# Patient Record
Sex: MALE | Race: WHITE | NOT HISPANIC OR LATINO | Employment: FULL TIME | URBAN - NONMETROPOLITAN AREA
[De-identification: names, ages, dates, MRNs, and addresses within clinical notes are randomized per-mention and may not be internally consistent; named-entity substitution may affect disease eponyms.]

---

## 2024-01-04 ENCOUNTER — HOSPITAL ENCOUNTER (OUTPATIENT)
Dept: RADIOLOGY | Facility: HOSPITAL | Age: 59
Discharge: HOME | End: 2024-01-04
Payer: COMMERCIAL

## 2024-01-04 DIAGNOSIS — R10.9 UNSPECIFIED ABDOMINAL PAIN: ICD-10-CM

## 2024-01-04 PROCEDURE — 76700 US EXAM ABDOM COMPLETE: CPT | Performed by: STUDENT IN AN ORGANIZED HEALTH CARE EDUCATION/TRAINING PROGRAM

## 2024-01-04 PROCEDURE — 76700 US EXAM ABDOM COMPLETE: CPT

## 2024-01-26 ASSESSMENT — ENCOUNTER SYMPTOMS
EYES NEGATIVE: 1
SHORTNESS OF BREATH: 0
COUGH: 0
ENDOCRINE NEGATIVE: 1
PSYCHIATRIC NEGATIVE: 1
NAUSEA: 0
ALLERGIC/IMMUNOLOGIC NEGATIVE: 1
FEVER: 0
DIFFICULTY URINATING: 0
CHILLS: 0

## 2024-01-26 NOTE — PROGRESS NOTES
Subjective   Patient ID: Joseph Barr III is a 58 y.o. male.    HPI  Patient is here for 1 year follow up with labs and US results. Hx of renal cyst and fatty liver. Recent abdominal US showed 3.3cm right interpolar region simple renal cyst. Chronic BPH with LUTs, sx are mild and stable, Denies urgency and frequency. Denies dysuria. Denies hematuria. Nocturia x1. No medications for the prostate. Most recent PSA was  0.43 on 1/24. Prior PSA was 0.34 on 2/22. Prior PSA was 0.33 in 2018. ..Hx of left hydrocele. S/P left hydrocelectomy 3-4 years ago. ED is mild, has had sildenafil the past.       Review of Systems   Constitutional:  Negative for chills and fever.   HENT: Negative.     Eyes: Negative.    Respiratory:  Negative for cough and shortness of breath.    Cardiovascular:  Negative for chest pain and leg swelling.   Gastrointestinal:  Negative for nausea.   Endocrine: Negative.    Genitourinary:  Negative for difficulty urinating.        Negative except for documented in HPI   Allergic/Immunologic: Negative.    Neurological:         Alert & oriented X 3   Hematological:         Denies blood thinners   Psychiatric/Behavioral: Negative.         Objective   Physical Exam  No PE done given the virtual nature of visit.   Assessment/Plan       Diagnoses and all orders for this visit:  Benign prostatic hyperplasia with urinary obstruction and other lower urinary tract symptoms  Erectile dysfunction, unspecified erectile dysfunction type  Nocturia  Renal cyst      All available PSA values reviewed, Options discussed. Questions answered.   Diet changes for prostate health discussed and educational information given. Pros/Cons of prostate health supplements discussed.   Treatment options for LUTS reviewed  Discussed timed voiding. Discussed fluid and caffeine intake  Treatment options for ED reviewed.  Sildenafil 100mg Rx called to [facility]  Lifestyle change to help prevent UTIs discussed. Encouraged fluid  intake.  U/S and CT reviewed-Patient aware of hepatic findings    F/U with PSA 1 year

## 2024-01-29 ENCOUNTER — TELEMEDICINE (OUTPATIENT)
Dept: UROLOGY | Facility: CLINIC | Age: 59
End: 2024-01-29
Payer: COMMERCIAL

## 2024-01-29 DIAGNOSIS — N28.1 RENAL CYST: ICD-10-CM

## 2024-01-29 DIAGNOSIS — R35.1 NOCTURIA: ICD-10-CM

## 2024-01-29 DIAGNOSIS — N13.8 BENIGN PROSTATIC HYPERPLASIA WITH URINARY OBSTRUCTION AND OTHER LOWER URINARY TRACT SYMPTOMS: ICD-10-CM

## 2024-01-29 DIAGNOSIS — N52.9 ERECTILE DYSFUNCTION, UNSPECIFIED ERECTILE DYSFUNCTION TYPE: ICD-10-CM

## 2024-01-29 DIAGNOSIS — N40.1 BENIGN PROSTATIC HYPERPLASIA WITH URINARY OBSTRUCTION AND OTHER LOWER URINARY TRACT SYMPTOMS: ICD-10-CM

## 2024-01-29 PROBLEM — R10.9 FLANK PAIN: Status: ACTIVE | Noted: 2020-01-03

## 2024-01-29 PROBLEM — N23 UNSPECIFIED RENAL COLIC: Status: ACTIVE | Noted: 2022-01-31

## 2024-01-29 PROBLEM — N43.40 SPERMATOCELE: Status: ACTIVE | Noted: 2024-01-29

## 2024-01-29 PROCEDURE — 99213 OFFICE O/P EST LOW 20 MIN: CPT | Performed by: UROLOGY

## 2024-01-29 RX ORDER — METOPROLOL SUCCINATE 100 MG/1
100 TABLET, EXTENDED RELEASE ORAL DAILY
COMMUNITY

## 2024-01-29 RX ORDER — ATORVASTATIN CALCIUM 40 MG/1
40 TABLET, FILM COATED ORAL
COMMUNITY
Start: 2023-10-11

## 2024-01-29 RX ORDER — ALPRAZOLAM 0.5 MG/1
0.5 TABLET ORAL DAILY PRN
COMMUNITY
Start: 2024-01-10

## 2024-01-29 RX ORDER — DICLOFENAC SODIUM 75 MG/1
75 TABLET, DELAYED RELEASE ORAL
COMMUNITY
Start: 2024-01-02

## 2024-01-29 RX ORDER — LISINOPRIL AND HYDROCHLOROTHIAZIDE 12.5; 2 MG/1; MG/1
2 TABLET ORAL
COMMUNITY
Start: 2024-01-10

## 2024-01-29 RX ORDER — SILDENAFIL 100 MG/1
100 TABLET, FILM COATED ORAL AS NEEDED
Qty: 30 TABLET | Refills: 3 | Status: SHIPPED | OUTPATIENT
Start: 2024-01-29 | End: 2024-02-28

## 2024-01-29 RX ORDER — MULTIVITAMIN
1 TABLET ORAL
COMMUNITY

## 2024-02-12 ENCOUNTER — APPOINTMENT (OUTPATIENT)
Dept: URBAN - METROPOLITAN AREA CLINIC 204 | Age: 59
Setting detail: DERMATOLOGY
End: 2024-02-13

## 2024-02-12 PROCEDURE — 99203 OFFICE O/P NEW LOW 30 MIN: CPT | Mod: 25

## 2024-02-12 PROCEDURE — 17000 DESTRUCT PREMALG LESION: CPT

## 2024-02-12 PROCEDURE — 17003 DESTRUCT PREMALG LES 2-14: CPT

## 2024-02-12 PROCEDURE — OTHER MIPS QUALITY: OTHER

## 2024-02-12 NOTE — PROCEDURE: MIPS QUALITY
Quality 47: Advance Care Plan: Advance Care Planning discussed and documented; advance care plan or surrogate decision maker documented in the medical record.
Quality 226: Preventive Care And Screening: Tobacco Use: Screening And Cessation Intervention: Patient screened for tobacco use and is an ex/non-smoker
Detail Level: Detailed
Additional Notes: Documentation for MIPS  purposes only. Full Patient visit note from paper chart is available for review and also scanned in EMA chart.